# Patient Record
Sex: FEMALE | Race: OTHER | NOT HISPANIC OR LATINO
[De-identification: names, ages, dates, MRNs, and addresses within clinical notes are randomized per-mention and may not be internally consistent; named-entity substitution may affect disease eponyms.]

---

## 2017-01-06 ENCOUNTER — APPOINTMENT (OUTPATIENT)
Dept: RHEUMATOLOGY | Facility: CLINIC | Age: 67
End: 2017-01-06

## 2021-04-28 ENCOUNTER — APPOINTMENT (OUTPATIENT)
Dept: GASTROENTEROLOGY | Facility: CLINIC | Age: 71
End: 2021-04-28
Payer: MEDICARE

## 2021-04-28 VITALS
HEART RATE: 70 BPM | WEIGHT: 126 LBS | RESPIRATION RATE: 12 BRPM | BODY MASS INDEX: 23.79 KG/M2 | HEIGHT: 61 IN | DIASTOLIC BLOOD PRESSURE: 70 MMHG | SYSTOLIC BLOOD PRESSURE: 124 MMHG

## 2021-04-28 DIAGNOSIS — Z78.9 OTHER SPECIFIED HEALTH STATUS: ICD-10-CM

## 2021-04-28 DIAGNOSIS — Z12.11 ENCOUNTER FOR SCREENING FOR MALIGNANT NEOPLASM OF COLON: ICD-10-CM

## 2021-04-28 PROCEDURE — 99214 OFFICE O/P EST MOD 30 MIN: CPT

## 2021-04-28 RX ORDER — ATORVASTATIN CALCIUM 80 MG/1
TABLET, FILM COATED ORAL
Refills: 0 | Status: ACTIVE | COMMUNITY

## 2021-04-28 NOTE — PHYSICAL EXAM
[General Appearance - Alert] : alert [General Appearance - In No Acute Distress] : in no acute distress [Sclera] : the sclera and conjunctiva were normal [PERRL With Normal Accommodation] : pupils were equal in size, round, and reactive to light [Outer Ear] : the ears and nose were normal in appearance [Extraocular Movements] : extraocular movements were intact [Oropharynx] : the oropharynx was normal [Neck Appearance] : the appearance of the neck was normal [Neck Cervical Mass (___cm)] : no neck mass was observed [Jugular Venous Distention Increased] : there was no jugular-venous distention [Thyroid Diffuse Enlargement] : the thyroid was not enlarged [Thyroid Nodule] : there were no palpable thyroid nodules [Auscultation Breath Sounds / Voice Sounds] : lungs were clear to auscultation bilaterally [Heart Rate And Rhythm] : heart rate was normal and rhythm regular [Heart Sounds Gallop] : no gallops [Heart Sounds] : normal S1 and S2 [Murmurs] : no murmurs [Heart Sounds Pericardial Friction Rub] : no pericardial rub [Full Pulse] : the pedal pulses are present [Edema] : there was no peripheral edema [Bowel Sounds] : normal bowel sounds [Abdomen Soft] : soft [Abdomen Mass (___ Cm)] : no abdominal mass palpated [Abnormal Walk] : normal gait [Nail Clubbing] : no clubbing  or cyanosis of the fingernails [Motor Tone] : muscle strength and tone were normal [Musculoskeletal - Swelling] : no joint swelling seen [Skin Color & Pigmentation] : normal skin color and pigmentation [Skin Turgor] : normal skin turgor [] : no rash [Deep Tendon Reflexes (DTR)] : deep tendon reflexes were 2+ and symmetric [Sensation] : the sensory exam was normal to light touch and pinprick [No Focal Deficits] : no focal deficits [Oriented To Time, Place, And Person] : oriented to person, place, and time [Affect] : the affect was normal [Impaired Insight] : insight and judgment were intact [FreeTextEntry1] : llq discomfort to palpation

## 2021-04-28 NOTE — HISTORY OF PRESENT ILLNESS
[___ Month(s) Ago] : [unfilled] month(s) ago [FreeTextEntry1] : 69 yo female, hx of sigmoid diverticulosis, last colonsocopy approx 8 years ago, with llq pain, worsening constipation. She denies rectal bleeding, n/v/fever or chills. S/p covid 19 vaccine x 2. \par \par \par \par \par \par \par \par

## 2021-04-28 NOTE — ASSESSMENT
[FreeTextEntry1] : 71 yo female, hx of diveritculosis with worsening constipation. LIkely ongoing diveritculosis, rule out stricture vs neoplasm ( doubt). Well controlled HTN, weight. \par \par Plan:\par \par 1. high fiber diet\par 2. Maintain psyllium OTC\par 3. D/c linzess if no help\par 4. Await colonoscopy.

## 2021-04-30 ENCOUNTER — LABORATORY RESULT (OUTPATIENT)
Age: 71
End: 2021-04-30

## 2021-05-04 ENCOUNTER — APPOINTMENT (OUTPATIENT)
Dept: GASTROENTEROLOGY | Facility: AMBULATORY SURGERY CENTER | Age: 71
End: 2021-05-04
Payer: MEDICARE

## 2021-05-04 PROCEDURE — 45380 COLONOSCOPY AND BIOPSY: CPT

## 2022-08-09 ENCOUNTER — APPOINTMENT (OUTPATIENT)
Dept: GASTROENTEROLOGY | Facility: CLINIC | Age: 72
End: 2022-08-09

## 2022-08-09 VITALS
SYSTOLIC BLOOD PRESSURE: 160 MMHG | HEIGHT: 61 IN | BODY MASS INDEX: 23.03 KG/M2 | DIASTOLIC BLOOD PRESSURE: 90 MMHG | WEIGHT: 122 LBS

## 2022-08-09 DIAGNOSIS — Z86.39 PERSONAL HISTORY OF OTHER ENDOCRINE, NUTRITIONAL AND METABOLIC DISEASE: ICD-10-CM

## 2022-08-09 DIAGNOSIS — R10.13 EPIGASTRIC PAIN: ICD-10-CM

## 2022-08-09 DIAGNOSIS — R63.4 ABNORMAL WEIGHT LOSS: ICD-10-CM

## 2022-08-09 DIAGNOSIS — R68.81 EARLY SATIETY: ICD-10-CM

## 2022-08-09 DIAGNOSIS — Z86.79 PERSONAL HISTORY OF OTHER DISEASES OF THE CIRCULATORY SYSTEM: ICD-10-CM

## 2022-08-09 PROCEDURE — 99214 OFFICE O/P EST MOD 30 MIN: CPT

## 2022-08-09 NOTE — ASSESSMENT
[FreeTextEntry1] : R/O gastric ulcer or CA\par \par GERHARD SMILEY was advised to undergo endoscopy to which she agreed. The procedure will be performed in [facility] with the assistance of an anesthesiologist. She was given a booklet distributed by the [employer] of [employer] explaining the procedure in detail and she understood the risks of the procedure not limited to infection, bleeding, perforation or non- diagnosis of gastric or esophageal cancer.  She was advised that she could not drive home, if she chooses to receive sedation. Further diagnostic and treatment recommendations will be based upon the procedure and any biopsies, if they are taken. Thank you for allowing me to participate in this [facility] health care.\par \par \par Linzess 290 mcg 1.2 hour before breakfast

## 2022-08-09 NOTE — CONSULT LETTER
[Dear  ___] : Dear  [unfilled], [Consult Letter:] : I had the pleasure of evaluating your patient, [unfilled]. [( Thank you for referring [unfilled] for consultation for _____ )] : Thank you for referring [unfilled] for consultation for [unfilled] [Please see my note below.] : Please see my note below. [Consult Closing:] : Thank you very much for allowing me to participate in the care of this patient.  If you have any questions, please do not hesitate to contact me. [Sincerely,] : Sincerely, [FreeTextEntry3] : Zhao Jackson MD\par \par Gastroenterology\par [facility]\par [facility]\par \par

## 2022-08-09 NOTE — PHYSICAL EXAM
[] : no respiratory distress [Auscultation Breath Sounds / Voice Sounds] : lungs were clear to auscultation bilaterally [Heart Rate And Rhythm] : heart rate was normal and rhythm regular [Heart Sounds] : normal S1 and S2 [Heart Sounds Gallop] : no gallops [Murmurs] : no murmurs [Heart Sounds Pericardial Friction Rub] : no pericardial rub [FreeTextEntry1] : slight midepigastric tenderness no masses or rebound

## 2022-08-09 NOTE — HISTORY OF PRESENT ILLNESS
[FreeTextEntry1] : She is a 71-year-old female with a recent onset of early satieyt,  decreased food intake, weight loss of 4 to 5 pounds over the past 3 weeks, intermittent mid epigastric abdominal pain  and constipation.  She had colonoscopy 3/4/2021 which revealed 1 small polyp and diverticular disease.  CAT scan done August 2nd was  normal.  She had been on Linzess 145mcg which has  been ineffective.

## 2022-08-09 NOTE — REVIEW OF SYSTEMS
[Recent Weight Loss (___ Lbs)] : recent [unfilled] ~Ulb weight loss [Abdominal Pain] : abdominal pain [Constipation] : constipation [Negative] : Heme/Lymph [FreeTextEntry7] : early satiety

## 2022-09-07 ENCOUNTER — APPOINTMENT (OUTPATIENT)
Dept: GASTROENTEROLOGY | Facility: AMBULATORY SURGERY CENTER | Age: 72
End: 2022-09-07

## 2023-02-24 ENCOUNTER — OFFICE (OUTPATIENT)
Dept: URBAN - METROPOLITAN AREA CLINIC 90 | Facility: CLINIC | Age: 73
Setting detail: OPHTHALMOLOGY
End: 2023-02-24
Payer: MEDICARE

## 2023-02-24 DIAGNOSIS — H35.3121: ICD-10-CM

## 2023-02-24 DIAGNOSIS — H16.221: ICD-10-CM

## 2023-02-24 DIAGNOSIS — D31.31: ICD-10-CM

## 2023-02-24 DIAGNOSIS — H10.45: ICD-10-CM

## 2023-02-24 DIAGNOSIS — H02.401: ICD-10-CM

## 2023-02-24 DIAGNOSIS — J45.909: ICD-10-CM

## 2023-02-24 PROCEDURE — 92014 COMPRE OPH EXAM EST PT 1/>: CPT | Performed by: OPHTHALMOLOGY

## 2023-02-24 PROCEDURE — 92250 FUNDUS PHOTOGRAPHY W/I&R: CPT | Performed by: OPHTHALMOLOGY

## 2023-02-24 ASSESSMENT — REFRACTION_CURRENTRX
OD_AXIS: 100
OD_SPHERE: +1.75
OD_OVR_VA: 20/
OD_SPHERE: +1.75
OS_AXIS: 081
OD_VPRISM_DIRECTION: SV
OS_CYLINDER: -0.75
OD_VPRISM_DIRECTION: SV
OD_OVR_VA: 20/
OS_SPHERE: +1.75
OS_AXIS: 081
OS_VPRISM_DIRECTION: SV
OD_SPHERE: -0.50
OS_SPHERE: +1.75
OS_CYLINDER: -0.75
OS_AXIS: 083
OD_AXIS: 100
OD_CYLINDER: -0.75
OS_CYLINDER: -0.75
OS_OVR_VA: 20/
OD_CYLINDER: -0.75
OD_CYLINDER: -0.75
OD_AXIS: 100
OD_VPRISM_DIRECTION: SV
OS_OVR_VA: 20/
OS_VPRISM_DIRECTION: SV
OS_OVR_VA: 20/
OS_SPHERE: -0.75
OD_OVR_VA: 20/

## 2023-02-24 ASSESSMENT — CONFRONTATIONAL VISUAL FIELD TEST (CVF)
OS_FINDINGS: FULL
OD_FINDINGS: FULL

## 2023-02-24 ASSESSMENT — SUPERFICIAL PUNCTATE KERATITIS (SPK): OD_SPK: T

## 2023-02-24 ASSESSMENT — KERATOMETRY
OD_AXISANGLE_DEGREES: 090
OS_K1POWER_DIOPTERS: 47.50
OS_AXISANGLE_DEGREES: 116
OD_K1POWER_DIOPTERS: 46.50
METHOD_AUTO_MANUAL: AUTO
OS_K2POWER_DIOPTERS: 48.00
OD_K2POWER_DIOPTERS: 46.50

## 2023-02-24 ASSESSMENT — REFRACTION_AUTOREFRACTION
OD_SPHERE: +0.75
OS_CYLINDER: -0.75
OS_SPHERE: -0.50
OD_AXIS: 106
OD_CYLINDER: -1.25
OS_AXIS: 067

## 2023-02-24 ASSESSMENT — TEAR BREAK UP TIME (TBUT): OD_TBUT: T

## 2023-02-24 ASSESSMENT — SPHEQUIV_DERIVED
OS_SPHEQUIV: -0.875
OD_SPHEQUIV: 0.125

## 2023-02-24 ASSESSMENT — VISUAL ACUITY
OD_BCVA: 20/25-1
OS_BCVA: 20/70+2

## 2023-02-24 ASSESSMENT — AXIALLENGTH_DERIVED
OD_AL: 22.4948
OS_AL: 22.4317

## 2023-02-24 ASSESSMENT — LID POSITION - PTOSIS: OD_PTOSIS: RUL T 1+

## 2023-03-02 ENCOUNTER — APPOINTMENT (OUTPATIENT)
Dept: GASTROENTEROLOGY | Facility: CLINIC | Age: 73
End: 2023-03-02
Payer: MEDICARE

## 2023-03-02 VITALS
OXYGEN SATURATION: 98 % | DIASTOLIC BLOOD PRESSURE: 70 MMHG | RESPIRATION RATE: 12 BRPM | SYSTOLIC BLOOD PRESSURE: 138 MMHG | WEIGHT: 118 LBS | HEIGHT: 61 IN | TEMPERATURE: 97.8 F | BODY MASS INDEX: 22.28 KG/M2 | HEART RATE: 76 BPM

## 2023-03-02 DIAGNOSIS — R63.0 ANOREXIA: ICD-10-CM

## 2023-03-02 PROCEDURE — 99214 OFFICE O/P EST MOD 30 MIN: CPT

## 2023-03-02 RX ORDER — SODIUM SULFATE, POTASSIUM SULFATE, MAGNESIUM SULFATE 17.5; 3.13; 1.6 G/ML; G/ML; G/ML
17.5-3.13-1.6 SOLUTION, CONCENTRATE ORAL
Qty: 2 | Refills: 0 | Status: DISCONTINUED | COMMUNITY
Start: 2021-04-28 | End: 2023-03-02

## 2023-03-02 RX ORDER — FLUTICASONE FUROATE AND VILANTEROL TRIFENATATE 50; 25 UG/1; UG/1
POWDER RESPIRATORY (INHALATION)
Refills: 0 | Status: ACTIVE | COMMUNITY

## 2023-03-02 RX ORDER — DEXLANSOPRAZOLE 60 MG/1
60 CAPSULE, DELAYED RELEASE ORAL
Qty: 30 | Refills: 3 | Status: DISCONTINUED | COMMUNITY
Start: 2022-08-09 | End: 2023-03-02

## 2023-03-02 RX ORDER — PANTOPRAZOLE SODIUM 40 MG/10ML
INJECTION, POWDER, FOR SOLUTION INTRAVENOUS
Refills: 0 | Status: DISCONTINUED | COMMUNITY
End: 2023-03-02

## 2023-03-02 RX ORDER — LOSARTAN POTASSIUM 100 MG/1
TABLET, FILM COATED ORAL
Refills: 0 | Status: ACTIVE | COMMUNITY

## 2023-03-02 RX ORDER — METOPROLOL SUCCINATE 50 MG/1
50 TABLET, EXTENDED RELEASE ORAL
Refills: 0 | Status: ACTIVE | COMMUNITY

## 2023-03-02 RX ORDER — LINACLOTIDE 145 UG/1
145 CAPSULE, GELATIN COATED ORAL
Refills: 0 | Status: DISCONTINUED | COMMUNITY
End: 2023-03-02

## 2023-03-02 RX ORDER — LINACLOTIDE 290 UG/1
290 CAPSULE, GELATIN COATED ORAL
Qty: 30 | Refills: 3 | Status: DISCONTINUED | COMMUNITY
Start: 2022-08-09 | End: 2023-03-02

## 2023-03-02 RX ORDER — AMLODIPINE BESYLATE 5 MG/1
TABLET ORAL
Refills: 0 | Status: DISCONTINUED | COMMUNITY
End: 2023-03-02

## 2023-03-02 NOTE — PHYSICAL EXAM

## 2023-03-02 NOTE — ASSESSMENT
[FreeTextEntry1] : 71 yo female with epigastric pain, weight loss, anorexia, bloating. Abdominal sonogram at [facility] yesterday, results pending. Has normal bms, no longer constipated. No help with famotidine.\par \par IMP:\par 1 epigastric pain- r/o hpylori, PUD\par 2. exclude gallstones\par 3. Well controlled HTN\par \par PLAN:\par 1. rabeprazole rx\par 2. She  was advised to undergo endoscopy to which she agreed. The procedure will be performed in [facility] with the assistance of an anesthesiologist. The procedure was explained in detail and she understood the risks of the procedure not limited  to infection, bleeding, perforation, risk of anesthesia and risk of IV site problems,emergency surgery, missed lesions  or non-diagnosis of stomach or esophageal  cancer.He/She]  was advised that she could not drive home alone, if the patient chooses to receive sedation. Further diagnostic and treatment recommendations will be based upon the procedure and any biopsies, if they are taken.\par 3. Await sonogram results.

## 2023-03-02 NOTE — CONSULT LETTER
[Dear  ___] : Dear  [unfilled], [Courtesy Letter:] : I had the pleasure of seeing your patient, [unfilled], in my office today. [Please see my note below.] : Please see my note below. [Consult Closing:] : Thank you very much for allowing me to participate in the care of this patient.  If you have any questions, please do not hesitate to contact me. [Sincerely,] : Sincerely, [FreeTextEntry3] : Ken Lehman MD, FACP, AGAF, FAASLD\par  of Medicine\par [facility]\par

## 2023-03-09 ENCOUNTER — APPOINTMENT (OUTPATIENT)
Dept: GASTROENTEROLOGY | Facility: AMBULATORY SURGERY CENTER | Age: 73
End: 2023-03-09
Payer: MEDICARE

## 2023-03-09 ENCOUNTER — LABORATORY RESULT (OUTPATIENT)
Age: 73
End: 2023-03-09

## 2023-03-09 PROCEDURE — 43239 EGD BIOPSY SINGLE/MULTIPLE: CPT

## 2023-04-19 ENCOUNTER — APPOINTMENT (OUTPATIENT)
Dept: GASTROENTEROLOGY | Facility: CLINIC | Age: 73
End: 2023-04-19
Payer: MEDICARE

## 2023-04-19 VITALS
DIASTOLIC BLOOD PRESSURE: 74 MMHG | HEIGHT: 61 IN | WEIGHT: 120 LBS | HEART RATE: 67 BPM | RESPIRATION RATE: 12 BRPM | OXYGEN SATURATION: 98 % | TEMPERATURE: 98 F | SYSTOLIC BLOOD PRESSURE: 130 MMHG | BODY MASS INDEX: 22.66 KG/M2

## 2023-04-19 DIAGNOSIS — K29.80 DUODENITIS W/OUT BLEEDING: ICD-10-CM

## 2023-04-19 DIAGNOSIS — R10.13 EPIGASTRIC PAIN: ICD-10-CM

## 2023-04-19 DIAGNOSIS — E78.2 MIXED HYPERLIPIDEMIA: ICD-10-CM

## 2023-04-19 DIAGNOSIS — I10 ESSENTIAL (PRIMARY) HYPERTENSION: ICD-10-CM

## 2023-04-19 DIAGNOSIS — K57.90 DIVERTICULOSIS OF INTESTINE, PART UNSPECIFIED, W/OUT PERFORATION OR ABSCESS W/OUT BLEEDING: ICD-10-CM

## 2023-04-19 DIAGNOSIS — M81.0 AGE-RELATED OSTEOPOROSIS W/OUT CURRENT PATHOLOGICAL FRACTURE: ICD-10-CM

## 2023-04-19 DIAGNOSIS — K59.00 CONSTIPATION, UNSPECIFIED: ICD-10-CM

## 2023-04-19 PROCEDURE — 99214 OFFICE O/P EST MOD 30 MIN: CPT

## 2023-04-19 NOTE — HISTORY OF PRESENT ILLNESS
[de-identified] : 03/2029 gastritis , duodenitis [FreeTextEntry1] : 2021diverticulosis, dim polyp (benign) [de-identified] : 03/2023 normal gallbladder [de-identified] : 03/2023 Luis c/w OP

## 2023-04-19 NOTE — ASSESSMENT
[FreeTextEntry1] : 72-year-old female history of dyspepsia and GERD who underwent endoscopy in March that demonstrated duodenitis.  She was doing much better rabeprazole with minimal symptoms.  She was recently told again of osteoporosis on a DEXA scan after completing 5 years of a bisphosphonate.  She is trying to get approval for Prolia which I support as bisphosphonate is contraindicated with her duodenitis and reflux symptoms.  Her abdominal ultrasound at [facility] radiology was unremarkable.  She is taking vitamin D but no calcium.\par \par IMP:\par 1. Duodenitis\par 2. Osteoporosis\par 3. Diverticulosis\par \par PLAN:\par 1. Rabeprazole for 30 more days and then attempt to d/c\par 2. Advised Citracal qd\par 3. AGREE with PROLIA given significant duodenitis\par 4. RTO 2months

## 2023-04-19 NOTE — CONSULT LETTER
[Dear  ___] : Dear  [unfilled], [Courtesy Letter:] : I had the pleasure of seeing your patient, [unfilled], in my office today. [Please see my note below.] : Please see my note below. [Consult Closing:] : Thank you very much for allowing me to participate in the care of this patient.  If you have any questions, please do not hesitate to contact me. [Sincerely,] : Sincerely, [FreeTextEntry3] : Ken Lehman MD, FACP, AGAF, FAASLD\par  of Medicine\par [facility]\par

## 2023-06-26 ENCOUNTER — RX RENEWAL (OUTPATIENT)
Age: 73
End: 2023-06-26

## 2023-06-26 RX ORDER — RABEPRAZOLE SODIUM 20 MG/1
20 TABLET, DELAYED RELEASE ORAL
Qty: 90 | Refills: 1 | Status: ACTIVE | COMMUNITY
Start: 2023-03-02 | End: 1900-01-01

## 2023-09-06 ENCOUNTER — OFFICE (OUTPATIENT)
Dept: URBAN - METROPOLITAN AREA CLINIC 90 | Facility: CLINIC | Age: 73
Setting detail: OPHTHALMOLOGY
End: 2023-09-06
Payer: MEDICARE

## 2023-09-06 DIAGNOSIS — H35.3121: ICD-10-CM

## 2023-09-06 DIAGNOSIS — H10.45: ICD-10-CM

## 2023-09-06 DIAGNOSIS — H16.221: ICD-10-CM

## 2023-09-06 DIAGNOSIS — D31.31: ICD-10-CM

## 2023-09-06 DIAGNOSIS — J45.909: ICD-10-CM

## 2023-09-06 DIAGNOSIS — H02.401: ICD-10-CM

## 2023-09-06 PROCEDURE — 92134 CPTRZ OPH DX IMG PST SGM RTA: CPT | Performed by: OPHTHALMOLOGY

## 2023-09-06 PROCEDURE — 92014 COMPRE OPH EXAM EST PT 1/>: CPT | Performed by: OPHTHALMOLOGY

## 2023-09-06 ASSESSMENT — REFRACTION_CURRENTRX
OD_VPRISM_DIRECTION: SV
OD_VPRISM_DIRECTION: SV
OS_AXIS: 081
OD_AXIS: 105
OS_OVR_VA: 20/
OD_VPRISM_DIRECTION: SV
OS_SPHERE: -0.75
OS_VPRISM_DIRECTION: SV
OS_CYLINDER: -0.75
OS_SPHERE: +1.75
OD_SPHERE: -0.50
OD_SPHERE: +1.75
OD_SPHERE: -0.50
OD_AXIS: 100
OS_OVR_VA: 20/
OS_SPHERE: +1.75
OS_AXIS: 084
OD_SPHERE: +1.75
OS_OVR_VA: 20/
OS_AXIS: 081
OD_OVR_VA: 20/
OD_CYLINDER: -0.75
OD_OVR_VA: 20/
OD_VPRISM_DIRECTION: SV
OD_CYLINDER: -0.75
OD_CYLINDER: -0.75
OS_VPRISM_DIRECTION: SV
OD_CYLINDER: -0.75
OS_VPRISM_DIRECTION: SV
OD_AXIS: 100
OD_SPHERE: +1.75
OD_CYLINDER: -0.75
OS_CYLINDER: -0.75
OS_AXIS: 081
OD_OVR_VA: 20/
OS_CYLINDER: -0.75
OS_CYLINDER: -0.75
OD_AXIS: 100
OS_CYLINDER: -0.75
OS_SPHERE: -0.75
OS_AXIS: 083
OD_AXIS: 106
OS_SPHERE: +1.75

## 2023-09-06 ASSESSMENT — LID POSITION - PTOSIS: OD_PTOSIS: RUL T 1+

## 2023-09-06 ASSESSMENT — REFRACTION_AUTOREFRACTION
OS_CYLINDER: -0.50
OD_SPHERE: +0.25
OD_CYLINDER: -0.50
OS_AXIS: 074
OD_AXIS: 126
OS_SPHERE: -1.00

## 2023-09-06 ASSESSMENT — KERATOMETRY
OS_K1POWER_DIOPTERS: 47.25
OD_K2POWER_DIOPTERS: 46.25
OD_AXISANGLE_DEGREES: 103
OD_K1POWER_DIOPTERS: 46.00
OS_AXISANGLE_DEGREES: 098
OS_K2POWER_DIOPTERS: 47.50
METHOD_AUTO_MANUAL: AUTO

## 2023-09-06 ASSESSMENT — CONFRONTATIONAL VISUAL FIELD TEST (CVF)
OD_FINDINGS: FULL
OS_FINDINGS: FULL

## 2023-09-06 ASSESSMENT — AXIALLENGTH_DERIVED
OD_AL: 22.6656
OS_AL: 22.6913

## 2023-09-06 ASSESSMENT — VISUAL ACUITY
OS_BCVA: 20/30-2
OD_BCVA: 20/40+2

## 2023-09-06 ASSESSMENT — TEAR BREAK UP TIME (TBUT): OD_TBUT: T

## 2023-09-06 ASSESSMENT — TONOMETRY
OD_IOP_MMHG: 12
OS_IOP_MMHG: 12

## 2023-09-06 ASSESSMENT — SUPERFICIAL PUNCTATE KERATITIS (SPK): OD_SPK: T

## 2023-09-06 ASSESSMENT — SPHEQUIV_DERIVED
OD_SPHEQUIV: 0
OS_SPHEQUIV: -1.25

## 2024-04-18 ENCOUNTER — OFFICE (OUTPATIENT)
Dept: URBAN - METROPOLITAN AREA CLINIC 90 | Facility: CLINIC | Age: 74
Setting detail: OPHTHALMOLOGY
End: 2024-04-18
Payer: MEDICARE

## 2024-04-18 DIAGNOSIS — H01.005: ICD-10-CM

## 2024-04-18 DIAGNOSIS — H01.004: ICD-10-CM

## 2024-04-18 PROBLEM — H02.401 PTOSIS OF EYELID, UNSPECIFIED; RIGHT EYE: Status: ACTIVE | Noted: 2024-04-18

## 2024-04-18 PROCEDURE — 92012 INTRM OPH EXAM EST PATIENT: CPT | Performed by: OPHTHALMOLOGY

## 2024-04-18 ASSESSMENT — LID POSITION - PTOSIS: OD_PTOSIS: RUL T 1+

## 2024-04-19 PROBLEM — H01.005 BLEPHARITIS; LEFT UPPER LID, LEFT LOWER LID: Status: ACTIVE | Noted: 2024-04-18

## 2024-04-19 PROBLEM — H01.004 BLEPHARITIS; LEFT UPPER LID, LEFT LOWER LID: Status: ACTIVE | Noted: 2024-04-18

## 2024-05-17 ENCOUNTER — OFFICE (OUTPATIENT)
Dept: URBAN - METROPOLITAN AREA CLINIC 90 | Facility: CLINIC | Age: 74
Setting detail: OPHTHALMOLOGY
End: 2024-05-17
Payer: MEDICARE

## 2024-05-17 DIAGNOSIS — H01.005: ICD-10-CM

## 2024-05-17 DIAGNOSIS — H01.004: ICD-10-CM

## 2024-05-17 DIAGNOSIS — H02.401: ICD-10-CM

## 2024-05-17 DIAGNOSIS — H10.45: ICD-10-CM

## 2024-05-17 DIAGNOSIS — H16.221: ICD-10-CM

## 2024-05-17 PROCEDURE — 92012 INTRM OPH EXAM EST PATIENT: CPT | Performed by: OPHTHALMOLOGY

## 2024-05-17 ASSESSMENT — LID POSITION - PTOSIS: OD_PTOSIS: RUL T 1+

## 2024-05-17 ASSESSMENT — CONFRONTATIONAL VISUAL FIELD TEST (CVF)
OD_FINDINGS: FULL
OS_FINDINGS: FULL

## 2024-07-17 ENCOUNTER — OFFICE (OUTPATIENT)
Dept: URBAN - METROPOLITAN AREA CLINIC 90 | Facility: CLINIC | Age: 74
Setting detail: OPHTHALMOLOGY
End: 2024-07-17
Payer: MEDICARE

## 2024-07-17 ENCOUNTER — RX ONLY (RX ONLY)
Age: 74
End: 2024-07-17

## 2024-07-17 DIAGNOSIS — H35.3121: ICD-10-CM

## 2024-07-17 DIAGNOSIS — H43.392: ICD-10-CM

## 2024-07-17 DIAGNOSIS — H16.221: ICD-10-CM

## 2024-07-17 DIAGNOSIS — H01.005: ICD-10-CM

## 2024-07-17 DIAGNOSIS — D31.31: ICD-10-CM

## 2024-07-17 DIAGNOSIS — H02.401: ICD-10-CM

## 2024-07-17 DIAGNOSIS — H01.004: ICD-10-CM

## 2024-07-17 PROCEDURE — 92134 CPTRZ OPH DX IMG PST SGM RTA: CPT | Performed by: OPHTHALMOLOGY

## 2024-07-17 PROCEDURE — 92014 COMPRE OPH EXAM EST PT 1/>: CPT | Performed by: OPHTHALMOLOGY

## 2024-07-17 ASSESSMENT — LID POSITION - PTOSIS: OD_PTOSIS: RUL T 1+

## 2024-07-17 ASSESSMENT — CONFRONTATIONAL VISUAL FIELD TEST (CVF)
OD_FINDINGS: FULL
OS_FINDINGS: FULL

## 2025-02-10 ENCOUNTER — APPOINTMENT (OUTPATIENT)
Dept: GASTROENTEROLOGY | Facility: CLINIC | Age: 75
End: 2025-02-10
Payer: MEDICARE

## 2025-02-10 VITALS
WEIGHT: 119 LBS | RESPIRATION RATE: 14 BRPM | OXYGEN SATURATION: 98 % | SYSTOLIC BLOOD PRESSURE: 150 MMHG | HEIGHT: 61 IN | BODY MASS INDEX: 22.47 KG/M2 | HEART RATE: 70 BPM | DIASTOLIC BLOOD PRESSURE: 100 MMHG | TEMPERATURE: 98 F

## 2025-02-10 DIAGNOSIS — K59.00 CONSTIPATION, UNSPECIFIED: ICD-10-CM

## 2025-02-10 DIAGNOSIS — R10.13 EPIGASTRIC PAIN: ICD-10-CM

## 2025-02-10 DIAGNOSIS — K29.80 DUODENITIS W/OUT BLEEDING: ICD-10-CM

## 2025-02-10 PROCEDURE — 99214 OFFICE O/P EST MOD 30 MIN: CPT

## 2025-02-10 RX ORDER — VONOPRAZAN FUMARATE 13.36 MG/1
10 TABLET ORAL
Refills: 0 | Status: ACTIVE | COMMUNITY

## 2025-02-10 RX ORDER — DEXLANSOPRAZOLE 60 MG/1
60 CAPSULE, DELAYED RELEASE ORAL
Qty: 90 | Refills: 3 | Status: ACTIVE | COMMUNITY
Start: 2025-02-10 | End: 1900-01-01

## 2025-02-10 RX ORDER — SUCRALFATE 1 G/1
1 TABLET ORAL
Qty: 60 | Refills: 6 | Status: ACTIVE | COMMUNITY
Start: 2025-02-10 | End: 1900-01-01

## 2025-03-19 ENCOUNTER — APPOINTMENT (OUTPATIENT)
Dept: GASTROENTEROLOGY | Facility: CLINIC | Age: 75
End: 2025-03-19

## 2025-05-20 ENCOUNTER — APPOINTMENT (OUTPATIENT)
Dept: GASTROENTEROLOGY | Facility: CLINIC | Age: 75
End: 2025-05-20
Payer: MEDICARE

## 2025-05-20 VITALS
DIASTOLIC BLOOD PRESSURE: 76 MMHG | SYSTOLIC BLOOD PRESSURE: 122 MMHG | HEIGHT: 61 IN | BODY MASS INDEX: 22.47 KG/M2 | WEIGHT: 119 LBS | HEART RATE: 75 BPM | OXYGEN SATURATION: 98 % | RESPIRATION RATE: 14 BRPM

## 2025-05-20 DIAGNOSIS — K59.00 CONSTIPATION, UNSPECIFIED: ICD-10-CM

## 2025-05-20 DIAGNOSIS — E78.2 MIXED HYPERLIPIDEMIA: ICD-10-CM

## 2025-05-20 DIAGNOSIS — R10.13 EPIGASTRIC PAIN: ICD-10-CM

## 2025-05-20 PROCEDURE — 99214 OFFICE O/P EST MOD 30 MIN: CPT

## 2025-05-20 PROCEDURE — 36415 COLL VENOUS BLD VENIPUNCTURE: CPT

## 2025-05-20 RX ORDER — DENOSUMAB 60 MG/ML
INJECTION SUBCUTANEOUS
Refills: 0 | Status: ACTIVE | COMMUNITY

## 2025-05-21 ENCOUNTER — NON-APPOINTMENT (OUTPATIENT)
Age: 75
End: 2025-05-21

## 2025-05-21 LAB
ALBUMIN SERPL ELPH-MCNC: 4.6 G/DL
ALP BLD-CCNC: 56 U/L
ALT SERPL-CCNC: 24 U/L
ANION GAP SERPL CALC-SCNC: 13 MMOL/L
AST SERPL-CCNC: 22 U/L
BASOPHILS # BLD AUTO: 0.05 K/UL
BASOPHILS NFR BLD AUTO: 0.8 %
BILIRUB SERPL-MCNC: 0.3 MG/DL
BUN SERPL-MCNC: 14 MG/DL
CALCIUM SERPL-MCNC: 9.5 MG/DL
CHLORIDE SERPL-SCNC: 96 MMOL/L
CO2 SERPL-SCNC: 22 MMOL/L
CREAT SERPL-MCNC: 0.79 MG/DL
EGFRCR SERPLBLD CKD-EPI 2021: 78 ML/MIN/1.73M2
EOSINOPHIL # BLD AUTO: 0.36 K/UL
EOSINOPHIL NFR BLD AUTO: 5.9 %
GGT SERPL-CCNC: 21 U/L
GLUCOSE SERPL-MCNC: 114 MG/DL
HCT VFR BLD CALC: 37.7 %
HGB BLD-MCNC: 12.6 G/DL
IMM GRANULOCYTES NFR BLD AUTO: 0.5 %
LPL SERPL-CCNC: 23 U/L
LYMPHOCYTES # BLD AUTO: 1.78 K/UL
LYMPHOCYTES NFR BLD AUTO: 29.3 %
MAN DIFF?: NORMAL
MCHC RBC-ENTMCNC: 30.1 PG
MCHC RBC-ENTMCNC: 33.4 G/DL
MCV RBC AUTO: 90 FL
MONOCYTES # BLD AUTO: 0.62 K/UL
MONOCYTES NFR BLD AUTO: 10.2 %
NEUTROPHILS # BLD AUTO: 3.23 K/UL
NEUTROPHILS NFR BLD AUTO: 53.3 %
PLATELET # BLD AUTO: 236 K/UL
POTASSIUM SERPL-SCNC: 4.7 MMOL/L
PROT SERPL-MCNC: 7 G/DL
RBC # BLD: 4.19 M/UL
RBC # FLD: 13.2 %
SODIUM SERPL-SCNC: 131 MMOL/L
WBC # FLD AUTO: 6.07 K/UL

## 2025-05-29 ENCOUNTER — NON-APPOINTMENT (OUTPATIENT)
Age: 75
End: 2025-05-29

## 2025-06-19 ENCOUNTER — APPOINTMENT (OUTPATIENT)
Dept: GASTROENTEROLOGY | Facility: CLINIC | Age: 75
End: 2025-06-19
Payer: MEDICARE

## 2025-06-19 VITALS
BODY MASS INDEX: 22.28 KG/M2 | OXYGEN SATURATION: 99 % | WEIGHT: 118 LBS | DIASTOLIC BLOOD PRESSURE: 70 MMHG | SYSTOLIC BLOOD PRESSURE: 130 MMHG | HEIGHT: 61 IN | RESPIRATION RATE: 14 BRPM | HEART RATE: 70 BPM

## 2025-06-19 PROCEDURE — 99213 OFFICE O/P EST LOW 20 MIN: CPT

## 2025-06-26 ENCOUNTER — RX CHANGE (OUTPATIENT)
Age: 75
End: 2025-06-26

## 2025-06-26 RX ORDER — HYOSCYAMINE SULFATE 0.12 MG/1
0.12 TABLET SUBLINGUAL
Qty: 180 | Refills: 1 | Status: ACTIVE | COMMUNITY
Start: 1900-01-01 | End: 1900-01-01

## 2025-06-26 RX ORDER — HYOSCYAMINE SULFATE 0.12 MG/1
0.12 TABLET SUBLINGUAL
Qty: 60 | Refills: 0 | Status: DISCONTINUED | COMMUNITY
Start: 2025-06-19 | End: 2025-06-26

## 2025-08-07 ENCOUNTER — OFFICE (OUTPATIENT)
Facility: LOCATION | Age: 75
Setting detail: OPHTHALMOLOGY
End: 2025-08-07
Payer: MEDICARE

## 2025-08-07 DIAGNOSIS — H16.223: ICD-10-CM

## 2025-08-07 DIAGNOSIS — H35.3121: ICD-10-CM

## 2025-08-07 DIAGNOSIS — D31.31: ICD-10-CM

## 2025-08-07 DIAGNOSIS — H01.005: ICD-10-CM

## 2025-08-07 DIAGNOSIS — H43.392: ICD-10-CM

## 2025-08-07 DIAGNOSIS — H01.004: ICD-10-CM

## 2025-08-07 DIAGNOSIS — H02.401: ICD-10-CM

## 2025-08-07 PROCEDURE — 92014 COMPRE OPH EXAM EST PT 1/>: CPT | Performed by: OPHTHALMOLOGY

## 2025-08-07 PROCEDURE — 92134 CPTRZ OPH DX IMG PST SGM RTA: CPT | Performed by: OPHTHALMOLOGY

## 2025-08-07 ASSESSMENT — VISUAL ACUITY
OD_BCVA: 20/30+2
OS_BCVA: 20/30+

## 2025-08-07 ASSESSMENT — REFRACTION_CURRENTRX
OS_CYLINDER: -0.75
OS_CYLINDER: -0.75
OD_CYLINDER: -0.75
OS_SPHERE: +1.75
OS_VPRISM_DIRECTION: SV
OD_SPHERE: -0.50
OS_AXIS: 084
OD_CYLINDER: -0.75
OS_AXIS: 083
OS_AXIS: 081
OS_CYLINDER: -0.75
OD_CYLINDER: -0.75
OD_AXIS: 100
OD_AXIS: 106
OD_AXIS: 100
OS_AXIS: 082
OD_CYLINDER: -0.75
OS_VPRISM_DIRECTION: SV
OS_CYLINDER: -0.75
OS_SPHERE: -0.75
OD_AXIS: 104
OS_VPRISM_DIRECTION: SV
OD_SPHERE: -0.50
OS_SPHERE: -0.75
OD_VPRISM_DIRECTION: SV
OD_SPHERE: +1.75
OD_OVR_VA: 20/
OS_VPRISM_DIRECTION: SV
OS_CYLINDER: -0.75
OS_CYLINDER: -0.75
OD_VPRISM_DIRECTION: SV
OD_SPHERE: +1.75
OS_SPHERE: +1.75
OS_CYLINDER: -0.75
OD_AXIS: 100
OS_SPHERE: +1.50
OD_VPRISM_DIRECTION: SV
OD_SPHERE: +1.75
OS_AXIS: 078
OS_AXIS: 083
OD_CYLINDER: -0.75
OD_CYLINDER: -0.75
OD_OVR_VA: 20/
OD_CYLINDER: -0.75
OD_VPRISM_DIRECTION: SV
OS_SPHERE: -0.75
OS_VPRISM_DIRECTION: SV
OD_VPRISM_DIRECTION: SV
OS_SPHERE: +1.75
OD_AXIS: 105
OD_VPRISM_DIRECTION: SV
OS_CYLINDER: -0.75
OS_AXIS: 089
OS_SPHERE: +1.50
OD_AXIS: 105
OS_AXIS: 081
OD_AXIS: 104
OD_CYLINDER: -0.75
OD_SPHERE: -0.50
OD_SPHERE: -0.50
OS_CYLINDER: -0.75
OD_SPHERE: +1.75
OS_AXIS: 081
OD_OVR_VA: 20/
OS_SPHERE: -0.75
OS_OVR_VA: 20/
OS_OVR_VA: 20/
OS_VPRISM_DIRECTION: SV
OD_AXIS: 098
OD_CYLINDER: -0.75
OS_OVR_VA: 20/
OD_SPHERE: +1.75

## 2025-08-07 ASSESSMENT — KERATOMETRY
OD_AXISANGLE_DEGREES: 083
METHOD_AUTO_MANUAL: AUTO
OS_K1POWER_DIOPTERS: 47.00
OS_K2POWER_DIOPTERS: 47.25
OD_K1POWER_DIOPTERS: 45.75
OS_AXISANGLE_DEGREES: 103
OD_K2POWER_DIOPTERS: 46.50

## 2025-08-07 ASSESSMENT — REFRACTION_AUTOREFRACTION
OD_AXIS: 111
OS_AXIS: 061
OS_CYLINDER: -0.75
OD_SPHERE: +0.25
OS_SPHERE: -0.25
OD_CYLINDER: -0.75

## 2025-08-07 ASSESSMENT — LID POSITION - PTOSIS: OD_PTOSIS: RUL T 1+

## 2025-08-07 ASSESSMENT — SUPERFICIAL PUNCTATE KERATITIS (SPK): OD_SPK: T

## 2025-08-07 ASSESSMENT — TEAR BREAK UP TIME (TBUT)
OD_TBUT: 1+
OS_TBUT: 1+

## 2025-08-07 ASSESSMENT — TONOMETRY
OD_IOP_MMHG: 15
OS_IOP_MMHG: 16